# Patient Record
Sex: FEMALE | Race: WHITE | Employment: OTHER | URBAN - METROPOLITAN AREA
[De-identification: names, ages, dates, MRNs, and addresses within clinical notes are randomized per-mention and may not be internally consistent; named-entity substitution may affect disease eponyms.]

---

## 2018-08-13 DIAGNOSIS — I73.9 PVD (PERIPHERAL VASCULAR DISEASE) (HCC): Primary | ICD-10-CM

## 2019-08-09 ENCOUNTER — HOSPITAL ENCOUNTER (OUTPATIENT)
Dept: MAMMOGRAPHY | Age: 84
Discharge: HOME | End: 2019-08-11
Payer: MEDICARE

## 2019-08-09 DIAGNOSIS — Z13.820 SCREENING FOR OSTEOPOROSIS: ICD-10-CM

## 2019-08-09 PROCEDURE — 77080 DXA BONE DENSITY AXIAL: CPT

## 2019-11-24 DIAGNOSIS — M25.512 ACUTE PAIN OF BOTH SHOULDERS: Primary | ICD-10-CM

## 2019-11-24 DIAGNOSIS — M25.511 ACUTE PAIN OF BOTH SHOULDERS: Primary | ICD-10-CM

## 2019-11-25 ENCOUNTER — OFFICE VISIT (OUTPATIENT)
Dept: ORTHOPEDIC SURGERY | Age: 84
End: 2019-11-25
Payer: MEDICARE

## 2019-11-25 VITALS — BODY MASS INDEX: 31.89 KG/M2 | HEIGHT: 63 IN | WEIGHT: 180 LBS | TEMPERATURE: 98 F

## 2019-11-25 DIAGNOSIS — M19.011 OSTEOARTHRITIS OF RIGHT SHOULDER, UNSPECIFIED OSTEOARTHRITIS TYPE: ICD-10-CM

## 2019-11-25 DIAGNOSIS — M19.012 OSTEOARTHRITIS OF LEFT SHOULDER, UNSPECIFIED OSTEOARTHRITIS TYPE: Primary | ICD-10-CM

## 2019-11-25 PROCEDURE — 4040F PNEUMOC VAC/ADMIN/RCVD: CPT | Performed by: ORTHOPAEDIC SURGERY

## 2019-11-25 PROCEDURE — G8484 FLU IMMUNIZE NO ADMIN: HCPCS | Performed by: ORTHOPAEDIC SURGERY

## 2019-11-25 PROCEDURE — 1036F TOBACCO NON-USER: CPT | Performed by: ORTHOPAEDIC SURGERY

## 2019-11-25 PROCEDURE — 1090F PRES/ABSN URINE INCON ASSESS: CPT | Performed by: ORTHOPAEDIC SURGERY

## 2019-11-25 PROCEDURE — G8417 CALC BMI ABV UP PARAM F/U: HCPCS | Performed by: ORTHOPAEDIC SURGERY

## 2019-11-25 PROCEDURE — 20610 DRAIN/INJ JOINT/BURSA W/O US: CPT | Performed by: ORTHOPAEDIC SURGERY

## 2019-11-25 PROCEDURE — 1123F ACP DISCUSS/DSCN MKR DOCD: CPT | Performed by: ORTHOPAEDIC SURGERY

## 2019-11-25 PROCEDURE — G8427 DOCREV CUR MEDS BY ELIG CLIN: HCPCS | Performed by: ORTHOPAEDIC SURGERY

## 2019-11-25 PROCEDURE — 99203 OFFICE O/P NEW LOW 30 MIN: CPT | Performed by: ORTHOPAEDIC SURGERY

## 2019-11-25 RX ORDER — TRIAMCINOLONE ACETONIDE 40 MG/ML
40 INJECTION, SUSPENSION INTRA-ARTICULAR; INTRAMUSCULAR ONCE
Status: COMPLETED | OUTPATIENT
Start: 2019-11-25 | End: 2019-11-26

## 2019-11-26 RX ADMIN — TRIAMCINOLONE ACETONIDE 40 MG: 40 INJECTION, SUSPENSION INTRA-ARTICULAR; INTRAMUSCULAR at 08:06

## 2020-02-27 ENCOUNTER — OFFICE VISIT (OUTPATIENT)
Dept: ORTHOPEDIC SURGERY | Age: 85
End: 2020-02-27
Payer: MEDICARE

## 2020-02-27 VITALS — WEIGHT: 180 LBS | HEIGHT: 63 IN | BODY MASS INDEX: 31.89 KG/M2

## 2020-02-27 PROCEDURE — 4040F PNEUMOC VAC/ADMIN/RCVD: CPT | Performed by: ORTHOPAEDIC SURGERY

## 2020-02-27 PROCEDURE — G8484 FLU IMMUNIZE NO ADMIN: HCPCS | Performed by: ORTHOPAEDIC SURGERY

## 2020-02-27 PROCEDURE — 1036F TOBACCO NON-USER: CPT | Performed by: ORTHOPAEDIC SURGERY

## 2020-02-27 PROCEDURE — G8427 DOCREV CUR MEDS BY ELIG CLIN: HCPCS | Performed by: ORTHOPAEDIC SURGERY

## 2020-02-27 PROCEDURE — 20610 DRAIN/INJ JOINT/BURSA W/O US: CPT | Performed by: ORTHOPAEDIC SURGERY

## 2020-02-27 PROCEDURE — G8417 CALC BMI ABV UP PARAM F/U: HCPCS | Performed by: ORTHOPAEDIC SURGERY

## 2020-02-27 PROCEDURE — 1123F ACP DISCUSS/DSCN MKR DOCD: CPT | Performed by: ORTHOPAEDIC SURGERY

## 2020-02-27 PROCEDURE — 1090F PRES/ABSN URINE INCON ASSESS: CPT | Performed by: ORTHOPAEDIC SURGERY

## 2020-02-27 PROCEDURE — 99213 OFFICE O/P EST LOW 20 MIN: CPT | Performed by: ORTHOPAEDIC SURGERY

## 2020-02-27 RX ORDER — METHYLPREDNISOLONE 4 MG/1
TABLET ORAL
COMMUNITY
Start: 2019-12-19 | End: 2020-02-27 | Stop reason: ALTCHOICE

## 2020-02-27 RX ORDER — TRIAMCINOLONE ACETONIDE 40 MG/ML
40 INJECTION, SUSPENSION INTRA-ARTICULAR; INTRAMUSCULAR ONCE
Status: COMPLETED | OUTPATIENT
Start: 2020-02-27 | End: 2020-02-27

## 2020-02-27 RX ORDER — OMEPRAZOLE 20 MG/1
CAPSULE, DELAYED RELEASE ORAL
COMMUNITY
Start: 2020-02-19

## 2020-02-27 RX ADMIN — TRIAMCINOLONE ACETONIDE 40 MG: 40 INJECTION, SUSPENSION INTRA-ARTICULAR; INTRAMUSCULAR at 11:45

## 2020-02-27 NOTE — PROGRESS NOTES
Chief Complaint   Patient presents with    Follow-up     follow up from right shoulder pain her last injection was 11/25/19, patient states last injection helped. Enrrique Jiménez is a 80y.o. year old   female who is seen today  for evaluation of right shoulder pain. She had csi for 6 weeks relief. Chief Complaint   Patient presents with    Follow-up     follow up from right shoulder pain her last injection was 11/25/19, patient states last injection helped. Past Medical History:   Diagnosis Date    GERD (gastroesophageal reflux disease)     History of cardiovascular stress test 9/9/2014    lexiscan    Hypertension      Past Surgical History:   Procedure Laterality Date    APPENDECTOMY      BACK SURGERY  2003    removal of calcium deposits vertebral fusion 8 screws placed    BREAST LUMPECTOMY      right breast [address]  2007    left hand    HIP FRACTURE SURGERY Right     HYSTERECTOMY      complete 1977    JOINT REPLACEMENT      OTHER SURGICAL HISTORY Right 2/13/2013    ORIF right hip    ROTATOR CUFF REPAIR      right shoulder 1986    TONSILLECTOMY      TOTAL KNEE ARTHROPLASTY  2006    right knee       Current Outpatient Medications:     omeprazole (PRILOSEC) 20 MG delayed release capsule, , Disp: , Rfl:     alendronate (FOSAMAX) 70 MG tablet, Take 70 mg by mouth every 7 days, Disp: , Rfl:     Cholecalciferol (VITAMIN D3) 3000 UNITS TABS, Take by mouth daily, Disp: , Rfl:     methimazole (TAPAZOLE) 5 MG tablet, Take 5 mg by mouth daily Take one half tablet., Disp: , Rfl:     pravastatin (PRAVACHOL) 40 MG tablet, , Disp: , Rfl:     metoprolol (LOPRESSOR) 25 MG tablet, Take 0.5 tablets by mouth 2 times daily. , Disp: 60 tablet, Rfl: 0    aspirin 81 MG tablet, Take 81 mg by mouth daily. , Disp: , Rfl:     multivitamin (THERAGRAN) per tablet, Take 1 tablet by mouth daily. , Disp: , Rfl:     gabapentin (NEURONTIN) 100 MG capsule, Take 1 capsule by mouth nightly (Patient not taking: Reported on 2/27/2020), Disp: 30 capsule, Rfl: 0    diphenhydrAMINE HCl, TOPICAL, 2 % GEL, Apply 1 Tube topically 4 times daily as needed (itching) (Patient not taking: Reported on 2/27/2020), Disp: 1 Tube, Rfl: 0    polyethylene glycol (GLYCOLAX) packet, Take 17 g by mouth daily as needed. , Disp: , Rfl:   Allergies   Allergen Reactions    Dexamethasone Other (See Comments)    Augmentin [Amoxicillin-Pot Clavulanate]     Ciprofloxacin Itching    Darvocet A500 [Propoxyphene N-Acetaminophen]     Maxaquin [Lomefloxacin]     Penicillins     Sulfa Antibiotics      Social History     Socioeconomic History    Marital status:      Spouse name: Not on file    Number of children: Not on file    Years of education: Not on file    Highest education level: Not on file   Occupational History    Not on file   Social Needs    Financial resource strain: Not on file    Food insecurity:     Worry: Not on file     Inability: Not on file    Transportation needs:     Medical: Not on file     Non-medical: Not on file   Tobacco Use    Smoking status: Never Smoker    Smokeless tobacco: Never Used   Substance and Sexual Activity    Alcohol use: No    Drug use: No    Sexual activity: Not on file   Lifestyle    Physical activity:     Days per week: Not on file     Minutes per session: Not on file    Stress: Not on file   Relationships    Social connections:     Talks on phone: Not on file     Gets together: Not on file     Attends [employer] service: Not on file     Active member of club or organization: Not on file     Attends meetings of clubs or organizations: Not on file     Relationship status: Not on file    Intimate partner violence:     Fear of current or ex partner: Not on file     Emotionally abused: Not on file     Physically abused: Not on file     Forced sexual activity: Not on file   Other Topics Concern    Not on file   Social History Narrative    Not on file History reviewed. No pertinent family history. REVIEW OF SYSTEMS:     General/Constitution:  (-)weight loss, (-)fever, (-)chills, (-)weakness. Skin: (-) rash,(-) psoriasis,(-) eczema, (-)skin cancer. Musculoskeletal: (-) fractures,  (-) dislocations,(-) collagen vascular disease, (-) fibromyalgia, (-) multiple sclerosis, (-) muscular dystrophy, (-) RSD,(-) joint pain (-)swelling, (-) joint pain,swelling. Neurologic: (-) epilepsy, (-)seizures,(-) brain tumor,(-) TIA, (-)stroke, (-)headaches, (-)Parkinson disease,(-) memory loss, (-) LOC. Cardiovascular: (-) Chest pain, (-) swelling in legs/feet, (-) SOB, (-) cramping in legs/feet with walking. Respiratory: (-) SOB, (-) Coughing, (-) night sweats. GI: (-) nausea, (-) vomiting, (-) diarrhea, (-) blood in stool, (-) gastric ulcer. Psychiatric: (-) Depression, (-) Anxiety, (-) bipolar disease, (-) Alzheimer's Disease  Allergic/Immunologic: (-) allergies latex, (-) allergies metal, (-) skin sensitivity. Hematlogic: (-) anemia, (-) blood transfusion, (-) DVT/PE, (-) Clotting disorders      Subjective:    Constitution:  Ht 5' 3\" (1.6 m)   Wt 180 lb (81.6 kg)   BMI 31.89 kg/m²     Psycihatric:  The patient is alert and oriented x 3, appears to be stated age and in no distress. Respiratory:  Respiratory effort is not labored. Patient is not gasping. Palpation of the chest reveals no tactile fremitus. Skin:  Upon inspection: the skin appears warm, dry and intact. There is not a previous scar over the affected area. There is not any cellulitis, lymphedema or cutaneous lesions noted in the lower extremities. Upon palpation there is no induration noted. Neurologic:  Motor exam of the upper extremities show: The reflexes in biceps/triceps/brachioradialis are equal and symmetric. Sensory exam C5-T1 are normal bilaterally. Cardiovascular: The vascular exam is normal and is well perfused to distal extremities. There are 2+ radial pulses Radiographic findings reviewed with patient    Impression:   Encounter Diagnosis   Name Primary?  Osteoarthritis of left shoulder, unspecified osteoarthritis type Yes       Plan: Natural history and expected course discussed. Questions answered. Gentle ROM exercises  Shoulder injection. See procedure note. Verbal and written consent was obtained by the patient. The skin was prepped with alcohol, 1ml of Kenalog 40mg and 9 ml of 0.25% Marcaine was injected through the  Anterior aspect of Right shoulder into the glenohumeral  joint. The patient tolerated the injection well.     Sarah pain cream

## 2020-05-26 ENCOUNTER — OFFICE VISIT (OUTPATIENT)
Dept: ORTHOPEDIC SURGERY | Age: 85
End: 2020-05-26
Payer: MEDICARE

## 2020-05-26 VITALS — WEIGHT: 179 LBS | HEIGHT: 63 IN | BODY MASS INDEX: 31.71 KG/M2

## 2020-05-26 PROCEDURE — 1123F ACP DISCUSS/DSCN MKR DOCD: CPT | Performed by: ORTHOPAEDIC SURGERY

## 2020-05-26 PROCEDURE — 1090F PRES/ABSN URINE INCON ASSESS: CPT | Performed by: ORTHOPAEDIC SURGERY

## 2020-05-26 PROCEDURE — G8417 CALC BMI ABV UP PARAM F/U: HCPCS | Performed by: ORTHOPAEDIC SURGERY

## 2020-05-26 PROCEDURE — 1036F TOBACCO NON-USER: CPT | Performed by: ORTHOPAEDIC SURGERY

## 2020-05-26 PROCEDURE — 4040F PNEUMOC VAC/ADMIN/RCVD: CPT | Performed by: ORTHOPAEDIC SURGERY

## 2020-05-26 PROCEDURE — 20610 DRAIN/INJ JOINT/BURSA W/O US: CPT | Performed by: ORTHOPAEDIC SURGERY

## 2020-05-26 PROCEDURE — G8427 DOCREV CUR MEDS BY ELIG CLIN: HCPCS | Performed by: ORTHOPAEDIC SURGERY

## 2020-05-26 PROCEDURE — 99213 OFFICE O/P EST LOW 20 MIN: CPT | Performed by: ORTHOPAEDIC SURGERY

## 2020-05-26 RX ORDER — TRIAMCINOLONE ACETONIDE 40 MG/ML
40 INJECTION, SUSPENSION INTRA-ARTICULAR; INTRAMUSCULAR ONCE
Status: COMPLETED | OUTPATIENT
Start: 2020-05-26 | End: 2020-05-26

## 2020-05-26 RX ADMIN — TRIAMCINOLONE ACETONIDE 40 MG: 40 INJECTION, SUSPENSION INTRA-ARTICULAR; INTRAMUSCULAR at 08:38

## 2020-05-26 NOTE — PROGRESS NOTES
General/Constitution:  (-)weight loss, (-)fever, (-)chills, (-)weakness. Skin: (-) rash,(-) psoriasis,(-) eczema, (-)skin cancer. Musculoskeletal: (-) fractures,  (-) dislocations,(-) collagen vascular disease, (-) fibromyalgia, (-) multiple sclerosis, (-) muscular dystrophy, (-) RSD,(-) joint pain (-)swelling, (-) joint pain,swelling. Neurologic: (-) epilepsy, (-)seizures,(-) brain tumor,(-) TIA, (-)stroke, (-)headaches, (-)Parkinson disease,(-) memory loss, (-) LOC. Cardiovascular: (-) Chest pain, (-) swelling in legs/feet, (-) SOB, (-) cramping in legs/feet with walking. Respiratory: (-) SOB, (-) Coughing, (-) night sweats. GI: (-) nausea, (-) vomiting, (-) diarrhea, (-) blood in stool, (-) gastric ulcer. Psychiatric: (-) Depression, (-) Anxiety, (-) bipolar disease, (-) Alzheimer's Disease  Allergic/Immunologic: (-) allergies latex, (-) allergies metal, (-) skin sensitivity. Hematlogic: (-) anemia, (-) blood transfusion, (-) DVT/PE, (-) Clotting disorders      Subjective:    Constitution:  Ht 5' 3\" (1.6 m)   Wt 179 lb (81.2 kg)   BMI 31.71 kg/m²     Psycihatric:  The patient is alert and oriented x 3, appears to be stated age and in no distress. Respiratory:  Respiratory effort is not labored. Patient is not gasping. Palpation of the chest reveals no tactile fremitus. Skin:  Upon inspection: the skin appears warm, dry and intact. There is not a previous scar over the affected area. There is not any cellulitis, lymphedema or cutaneous lesions noted in the lower extremities. Upon palpation there is no induration noted. Neurologic:  Motor exam of the upper extremities show: The reflexes in biceps/triceps/brachioradialis are equal and symmetric. Sensory exam C5-T1 are normal bilaterally. Cardiovascular: The vascular exam is normal and is well perfused to distal extremities. There are 2+ radial pulses bilaterally, and motor and sensation is intact to median, ulnar, and radial, musclocutaneus, and axillary nerve distribution and grossly symmetric bilaterally. There is cap refill noted less than two seconds in all digits. There is not edema of the bilateral upper extremities. There is not varicosities noted in the distal extremities. Lymph:  Upon palpation,  there is no lymphadenopathy noted in bilateral upper extremities. Musculoskeletal:  Gait: normal; examination of the nails and digits reveal no cyanosis or clubbing. Cervical Exam:  On physical exam, Sandeep Kramer is well-developed, well-nourished, oriented to person, place and time. her gait is normal.  On evaluation of hercervical spine, She has full range of motion of the cervical spine without pain. There is no cervical tenderness to palpation. Shoulder Exam:  On evaluation of her bilaterally upper extremities, her left  shoulder has no deformity. There is tenderness upon palpation to anterior shoulder. There is not evidence of scapular dyskinesis. There is not muscle atrophy in shoulder girdle. The range of motion for the Right Shoulder is 140/30/t12 and for the Left shoulder is 90/25/l2. Right shoulder Motor strength is 4/5 in the supraspinatus, 5/5 internal rotation and 5/5 in external rotation, and Left shoulder motor strength 5/5 in supraspinatus, 5/5 in internal rotation, 5/5 in external rotation. Right shoulder has 5/5 strength throughout. Right shoulder:  negative Impingement , negative Salas ,negative  Speeds,negative  Apprehension ,negative Senior Load Shift, negative Lakshmi manuver, negative Cross arm test.     Left shoulder:  positive Impingement ,  Positive Salas ,positive  Speeds,positive  Apprehension ,negative Senior Load Shift, negative Lakshmi manuver, negative Cross arm test.     XRAY:      1. Bilateral osteoarthritis most severe left glenohumeral joint. 2. Probable rotator cuff insufficiency bilaterally.            Radiographic findings reviewed with patient    Impression: Encounter Diagnosis   Name Primary?  Osteoarthritis of left shoulder, unspecified osteoarthritis type Yes       Plan: Natural history and expected course discussed. Questions answered. Gentle ROM exercises  Shoulder injection. See procedure note. Verbal and written consent was obtained by the patient. The skin was prepped with alcohol, 1ml of Kenalog 40mg and 9 ml of 0.25% Marcaine was injected through the  Anterior aspect of left shoulder into the glenohumeral  joint. The patient tolerated the injection well.     Continue Sarah pain cream

## 2021-04-08 ENCOUNTER — HOSPITAL ENCOUNTER (EMERGENCY)
Age: 86
Discharge: ANOTHER HOSPITAL | End: 2021-04-08
Payer: MEDICARE

## 2021-04-08 VITALS
HEART RATE: 87 BPM | OXYGEN SATURATION: 96 % | SYSTOLIC BLOOD PRESSURE: 100 MMHG | HEIGHT: 63 IN | RESPIRATION RATE: 20 BRPM | TEMPERATURE: 99.3 F | BODY MASS INDEX: 31.89 KG/M2 | DIASTOLIC BLOOD PRESSURE: 71 MMHG | WEIGHT: 180 LBS

## 2021-04-08 DIAGNOSIS — R60.0 BILATERAL LOWER EXTREMITY EDEMA: Primary | ICD-10-CM

## 2021-04-08 DIAGNOSIS — R06.00 DYSPNEA, UNSPECIFIED TYPE: ICD-10-CM

## 2021-04-08 PROCEDURE — 99211 OFF/OP EST MAY X REQ PHY/QHP: CPT

## 2021-04-08 NOTE — ED PROVIDER NOTES
[facility]  Department of Emergency Medicine  UC Encounter Note  21   6:37 PM EDT      NAME: Martine Guardado  :  1932  MRN:  [id number]    Chief Complaint: Joint Swelling (Both ankles swollen. Right ankle > Left ankle. Also states right foot feels numb.)      This is a 22-year-old female the presents to urgent care with family. For the past week she has been experiencing more bilateral lower extremity swelling. She has been complaining of some fatigue and some shortness of breath as well. She does feel some numbness in her feet as well more on the right side than left. On first contact with patient she appears slightly short of breath but not in any distress. Review of Systems  Pertinent positives and negatives are stated within HPI, all other systems reviewed and are negative. Physical Exam  Vitals signs and nursing note reviewed. Constitutional:       Appearance: She is well-developed. HENT:      Head: Normocephalic and atraumatic. Right Ear: Hearing and external ear normal.      Left Ear: Hearing and external ear normal.      Nose: Nose normal.      Mouth/Throat:      Pharynx: Uvula midline. Eyes:      General: Lids are normal.      Conjunctiva/sclera: Conjunctivae normal.      Pupils: Pupils are equal, round, and reactive to light. Neck:      Musculoskeletal: Normal range of motion and neck supple. Cardiovascular:      Rate and Rhythm: Normal rate and regular rhythm. Heart sounds: Normal heart sounds. No murmur. Pulmonary:      Effort: Pulmonary effort is normal.      Breath sounds: Decreased breath sounds present. Abdominal:      General: Bowel sounds are normal.      Palpations: Abdomen is soft. Abdomen is not rigid. Tenderness: There is no abdominal tenderness. There is no guarding or rebound. Musculoskeletal:      Right lower le+ Edema present. Left lower le+ Edema present.    Skin:     General: Skin is warm and dry. Findings: No abrasion or rash. Neurological:      Mental Status: She is alert and oriented to person, place, and time. GCS: GCS eye subscore is 4. GCS verbal subscore is 5. GCS motor subscore is 6. Cranial Nerves: No cranial nerve deficit. Sensory: No sensory deficit. Coordination: Coordination normal.      Gait: Gait normal.         Procedures    MDM  Number of Diagnoses or Management Options  Bilateral lower extremity edema  Dyspnea, unspecified type  Diagnosis management comments: Even when the patient is talking she appears to be a little bit short of breath. However she is in no distress. She does complain about increased leg swelling especially on the right side. She does have palpable pedal pulses. I do not see any cyanosis in the legs. She does have some diminished lung sounds I do recommend she go to the ER immediately after discharge from urgent care. Family can take her there. I told her she needs to be checked for congestive heart failure. She agrees to go. She is stable to go by private vehicle.           --------------------------------------------- PAST HISTORY ---------------------------------------------  Past Medical History:  has a past medical history of GERD (gastroesophageal reflux disease), History of cardiovascular stress test, Hyperlipidemia, Hypertension, and Thyroid disease. Past Surgical History:  has a past surgical history that includes Hysterectomy; Breast lumpectomy; Rotator cuff repair; back surgery (2003); Total knee arthroplasty (2006); Carpal tunnel release (2007); Appendectomy; Tonsillectomy; joint replacement; other surgical history (Right, 2/13/2013); and Hip fracture surgery (Right). Social History:  reports that she has never smoked. She has never used smokeless tobacco. She reports that she does not drink alcohol or use drugs. Family History: family history is not on file.      The patients home medications have been reviewed. Allergies: Dexamethasone, Augmentin [amoxicillin-pot clavulanate], Ciprofloxacin, Darvocet a500 [propoxyphene n-acetaminophen], Maxaquin [lomefloxacin], Penicillins, and Sulfa antibiotics    -------------------------------------------------- RESULTS -------------------------------------------------  No results found for this visit on 04/08/21. No orders to display       ------------------------- NURSING NOTES AND VITALS REVIEWED ---------------------------   The nursing notes within the ED encounter and vital signs as below have been reviewed. /71   Pulse 87   Temp 99.3 °F (37.4 °C) (Infrared)   Resp 20   Ht 5' 3\" (1.6 m)   Wt 180 lb (81.6 kg)   SpO2 96%   BMI 31.89 kg/m²   Oxygen Saturation Interpretation: Normal      ------------------------------------------ PROGRESS NOTES ------------------------------------------   I have spoken with the patient and discussed todays results, in addition to providing specific details for the plan of care and counseling regarding the diagnosis and prognosis. Their questions are answered at this time and they are agreeable with the plan.      --------------------------------- ADDITIONAL PROVIDER NOTES ---------------------------------     This patient is stable for discharge. I have shared the specific conditions for return, as well as the importance of follow-up. * NOTE: This report was transcribed using voice recognition software. Every effort was made to ensure accuracy; however, inadvertent computerized transcription errors may be present.    --------------------------------- IMPRESSION AND DISPOSITION ---------------------------------    IMPRESSION  1. Bilateral lower extremity edema    2.  Dyspnea, unspecified type        DISPOSITION  Disposition: Discharge to ED  Patient condition is stable       Romario Donis PA-C  04/08/21 2011

## 2021-08-17 ENCOUNTER — HOSPITAL ENCOUNTER (OUTPATIENT)
Dept: MAMMOGRAPHY | Age: 86
Discharge: HOME | End: 2021-08-19
Payer: MEDICARE

## 2021-08-17 DIAGNOSIS — D34 THYROID ADENOMA: ICD-10-CM

## 2021-08-17 DIAGNOSIS — E05.90 HYPERTHYROIDISM, SUBCLINICAL: ICD-10-CM

## 2021-08-17 DIAGNOSIS — R73.03 PREDIABETES: ICD-10-CM

## 2021-08-17 DIAGNOSIS — M81.0 SENILE OSTEOPOROSIS: ICD-10-CM

## 2021-08-17 PROCEDURE — 77080 DXA BONE DENSITY AXIAL: CPT

## 2023-08-15 LAB
ALANINE AMINOTRANSFERASE (SGPT) (U/L) IN SER/PLAS: 11 U/L (ref 7–45)
ALBUMIN (G/DL) IN SER/PLAS: 4.3 G/DL (ref 3.4–5)
ALKALINE PHOSPHATASE (U/L) IN SER/PLAS: 49 U/L (ref 33–136)
ANION GAP IN SER/PLAS: 15 MMOL/L (ref 10–20)
ASPARTATE AMINOTRANSFERASE (SGOT) (U/L) IN SER/PLAS: 19 U/L (ref 9–39)
BILIRUBIN TOTAL (MG/DL) IN SER/PLAS: 0.4 MG/DL (ref 0–1.2)
CALCIDIOL (25 OH VITAMIN D3) (NG/ML) IN SER/PLAS: 49 NG/ML
CALCIUM (MG/DL) IN SER/PLAS: 9.8 MG/DL (ref 8.6–10.6)
CARBON DIOXIDE, TOTAL (MMOL/L) IN SER/PLAS: 25 MMOL/L (ref 21–32)
CHLORIDE (MMOL/L) IN SER/PLAS: 104 MMOL/L (ref 98–107)
CREATININE (MG/DL) IN SER/PLAS: 1.2 MG/DL (ref 0.5–1.05)
ESTIMATED AVERAGE GLUCOSE FOR HBA1C: 111 MG/DL
GFR FEMALE: 43 ML/MIN/1.73M2
GLUCOSE (MG/DL) IN SER/PLAS: 87 MG/DL (ref 74–99)
HEMOGLOBIN A1C/HEMOGLOBIN TOTAL IN BLOOD: 5.5 %
POTASSIUM (MMOL/L) IN SER/PLAS: 4.5 MMOL/L (ref 3.5–5.3)
PROTEIN TOTAL: 7.2 G/DL (ref 6.4–8.2)
SODIUM (MMOL/L) IN SER/PLAS: 139 MMOL/L (ref 136–145)
THYROTROPIN (MIU/L) IN SER/PLAS BY DETECTION LIMIT <= 0.05 MIU/L: 1.97 MIU/L (ref 0.44–3.98)
THYROXINE (T4) FREE (NG/DL) IN SER/PLAS: 1.08 NG/DL (ref 0.78–1.48)
TRIIODOTHYRONINE (T3) (NG/DL) IN SER/PLAS: 103 NG/DL (ref 60–200)
UREA NITROGEN (MG/DL) IN SER/PLAS: 23 MG/DL (ref 6–23)

## 2023-08-23 LAB — THYROID STIMULATING IMMUNOGLOBULIN: <1 TSI INDEX

## 2023-09-11 DIAGNOSIS — M25.512 ACUTE PAIN OF LEFT SHOULDER: Primary | ICD-10-CM

## 2023-10-04 DIAGNOSIS — M25.512 LEFT SHOULDER PAIN, UNSPECIFIED CHRONICITY: Primary | ICD-10-CM

## 2023-10-09 ENCOUNTER — OFFICE VISIT (OUTPATIENT)
Dept: ORTHOPEDIC SURGERY | Age: 88
End: 2023-10-09

## 2023-10-09 VITALS — HEIGHT: 63 IN | TEMPERATURE: 98 F | BODY MASS INDEX: 31.89 KG/M2

## 2023-10-09 DIAGNOSIS — M19.012 GLENOHUMERAL ARTHRITIS, LEFT: Primary | ICD-10-CM

## 2023-10-09 RX ORDER — TRIAMCINOLONE ACETONIDE 40 MG/ML
40 INJECTION, SUSPENSION INTRA-ARTICULAR; INTRAMUSCULAR ONCE
Status: COMPLETED | OUTPATIENT
Start: 2023-10-09 | End: 2023-10-09

## 2023-10-09 RX ADMIN — TRIAMCINOLONE ACETONIDE 40 MG: 40 INJECTION, SUSPENSION INTRA-ARTICULAR; INTRAMUSCULAR at 15:42

## 2023-10-09 NOTE — PROGRESS NOTES
Chief Complaint   Patient presents with    Shoulder Pain     Left Shoulder x couple months, no known recent injury, no previous left shoulder surgery. States of pain and difficulty with ROM. Previous injections years ago with good relief. Ella Zaragoza is a 80y.o. year old   female who is seen today  for evaluation of left shoulder pain. She reports the pain has been ongoing for the past 3 months. She does not recall a specific injury which started the pain. She reports the pain is worse with overhead activity, better with rest.  The patient does not have mechanical symptoms. Shedoes not have night pain. She denies a feeling of instability. The prior treatments have been Tylenol, body cream.  The patient   has not responded to the treatment. The patient is right hand dominant. The patient is not working. Chief Complaint   Patient presents with    Shoulder Pain     Left Shoulder x couple months, no known recent injury, no previous left shoulder surgery. States of pain and difficulty with ROM. Previous injections years ago with good relief.        Past Medical History:   Diagnosis Date    GERD (gastroesophageal reflux disease)     History of cardiovascular stress test 9/9/2014    lexiscan    Hyperlipidemia     Hypertension     Thyroid disease      Past Surgical History:   Procedure Laterality Date    APPENDECTOMY      BACK SURGERY  2003    removal of calcium deposits vertebral fusion 8 screws placed    BREAST LUMPECTOMY      right breast [facility]  2007    left hand    HIP FRACTURE SURGERY Right     HYSTERECTOMY      complete 1977    JOINT REPLACEMENT      OTHER SURGICAL HISTORY Right 2/13/2013    ORIF right hip    ROTATOR CUFF REPAIR      right shoulder [id number] [facility] ARTHROPLASTY  2006    right knee       Current Outpatient Medications:     omeprazole (PRILOSEC) 20 MG delayed release capsule, , Disp: , Rfl:     alendronate (FOSAMAX) 70 MG

## 2024-01-11 ENCOUNTER — OFFICE VISIT (OUTPATIENT)
Dept: ORTHOPEDIC SURGERY | Age: 89
End: 2024-01-11

## 2024-01-11 VITALS — HEIGHT: 63 IN | WEIGHT: 180 LBS | BODY MASS INDEX: 31.89 KG/M2 | TEMPERATURE: 98 F

## 2024-01-11 DIAGNOSIS — M19.012 GLENOHUMERAL ARTHRITIS, LEFT: Primary | ICD-10-CM

## 2024-01-11 RX ORDER — TRIAMCINOLONE ACETONIDE 40 MG/ML
40 INJECTION, SUSPENSION INTRA-ARTICULAR; INTRAMUSCULAR ONCE
Status: COMPLETED | OUTPATIENT
Start: 2024-01-11 | End: 2024-01-11

## 2024-01-11 RX ADMIN — TRIAMCINOLONE ACETONIDE 40 MG: 40 INJECTION, SUSPENSION INTRA-ARTICULAR; INTRAMUSCULAR at 08:16

## 2024-01-11 NOTE — PROGRESS NOTES
spine without pain.  There is no cervical tenderness to palpation.     Shoulder Exam:  On evaluation of her bilaterally upper extremities, her left shoulder has no deformity.  There is tenderness upon palpation of the anterolateral shoulder.  There is not evidence of scapular dyskinesis.  There is not muscle atrophy in shoulder girdle. The range of motion for the Right Shoulder is 150/45/t8 and for the Left shoulder is 70/30/bl.  Right shoulder Motor strength is 5/5 in the supraspinatus, 5/5 internal rotation and 5/5 in external rotation, and Left shoulder motor strength 5/5 in supraspinatus, 5/5 in internal rotation, 5/5 in external rotation.        Right shoulder:  negative Impingement , negative Salas ,negative  Speeds,negative  Apprehension ,negative Senior Load Shift, negative Lakshmi manuver, negative Cross arm test.     Left shoulder:  positive Impingement , positive Salas ,negative  Speeds,negative  Apprehension ,negative Senior Load Shift, negative Lakshmi manuver, negative Cross arm test.     XRAY:  No acute abnormality.     AC and glenohumeral joint degenerative changes.    MRI:  None    Radiographic findings reviewed with patient    Impression:   Encounter Diagnosis   Name Primary?    Glenohumeral arthritis, left Yes       Plan: Natural history and expected course discussed. Questions answered.  Educational material distributed.  Reduction in offending activity.  Gentle ROM exercises  OTC analgesics as needed.       I will proceed with a cortisone injection in the Left shoulder.  Verbal and written consent was obtained for the injection. Skin was prepped with alcohol, 1 ml of Kenalog 40mg and 9 ml of 0.25% Marcaine was injected into the anterior aspect into the glenohumeral joint of the Left shoulder. The patient tolerated the injections well. I will see the patient back prn.      I was present and performed the entire exam and or procedure.  I agree with the documentation that was recorded by my

## 2024-02-26 ENCOUNTER — OFFICE VISIT (OUTPATIENT)
Dept: ENDOCRINOLOGY | Age: 89
End: 2024-02-26
Payer: MEDICARE

## 2024-02-26 VITALS
BODY MASS INDEX: 28.17 KG/M2 | HEIGHT: 63 IN | DIASTOLIC BLOOD PRESSURE: 74 MMHG | SYSTOLIC BLOOD PRESSURE: 129 MMHG | RESPIRATION RATE: 18 BRPM | HEART RATE: 84 BPM | OXYGEN SATURATION: 98 % | WEIGHT: 159 LBS

## 2024-02-26 DIAGNOSIS — E05.90 HYPERTHYROIDISM: Primary | ICD-10-CM

## 2024-02-26 DIAGNOSIS — E55.9 VITAMIN D DEFICIENCY: ICD-10-CM

## 2024-02-26 DIAGNOSIS — M81.0 OSTEOPOROSIS, UNSPECIFIED OSTEOPOROSIS TYPE, UNSPECIFIED PATHOLOGICAL FRACTURE PRESENCE: ICD-10-CM

## 2024-02-26 PROCEDURE — 1090F PRES/ABSN URINE INCON ASSESS: CPT | Performed by: INTERNAL MEDICINE

## 2024-02-26 PROCEDURE — 1123F ACP DISCUSS/DSCN MKR DOCD: CPT | Performed by: INTERNAL MEDICINE

## 2024-02-26 PROCEDURE — G8417 CALC BMI ABV UP PARAM F/U: HCPCS | Performed by: INTERNAL MEDICINE

## 2024-02-26 PROCEDURE — 1036F TOBACCO NON-USER: CPT | Performed by: INTERNAL MEDICINE

## 2024-02-26 PROCEDURE — G8427 DOCREV CUR MEDS BY ELIG CLIN: HCPCS | Performed by: INTERNAL MEDICINE

## 2024-02-26 PROCEDURE — 99204 OFFICE O/P NEW MOD 45 MIN: CPT | Performed by: INTERNAL MEDICINE

## 2024-02-26 PROCEDURE — G8484 FLU IMMUNIZE NO ADMIN: HCPCS | Performed by: INTERNAL MEDICINE

## 2024-02-26 RX ORDER — METHIMAZOLE 5 MG/1
TABLET ORAL
Qty: 45 TABLET | Refills: 3 | Status: SHIPPED | OUTPATIENT
Start: 2024-02-26

## 2024-02-26 NOTE — PROGRESS NOTES
[facility] Department of Endocrinology Diabetes and Metabolism   [address] [place] [id number]   Phone: [phone]  Fax: [phone]    Date of Service: 2/26/2024  Primary Care Physician: Marko Phillips MD  Referring physician: Marko Phillips MD  Provider: Go Zhao MD    Reason for the visit:  Hyperthyroidism    History of Present Illness:  The history is provided by the patient. No  was used. Accuracy of the patient data is excellent.    Natali Verduzco is a very pleasant 89 y.o. female seen today for evaluation and management of hyperthyroidism     Natali Verduzco was diagnosed with hyperthyroidism many years ago and currently on Methimazole 2.5 mg Rpo-[id number]-Ud-Fri and none 3 days a weeks     Feels good on the current dose     8/2023 - TSH 1.97, FT4 1.08,    Patient denied any history of  palpitations, swelling in the area of the thyroid gland, weight loss, change in appetite, nervousness, anxiety, irritability, tremor, sweating, heat intolerance, changes in bowel habits, muscle weakness or difficulty sleeping.     Patient also denied any h/o unexplained weight gain, new fatigue, increased sensitivity to cold, dry skin, brittle fingernails, brittle hair, facial swelling/puffiness, or depressed mood.      Osteoporosis   Used to be on fosamax  in the past   On vitD supplement     PAST MEDICAL HISTORY   Past Medical History:   Diagnosis Date    GERD (gastroesophageal reflux disease)     History of cardiovascular stress test 9/9/2014    lexiscan    Hyperlipidemia     Hypertension     Thyroid disease        PAST SURGICAL HISTORY   Past Surgical History:   Procedure Laterality Date    APPENDECTOMY      BACK SURGERY  2003    removal of calcium deposits vertebral fusion 8 screws placed    BREAST LUMPECTOMY      right breast 1984    CARPAL TUNNEL RELEASE  2007    left hand    HIP FRACTURE SURGERY Right     HYSTERECTOMY (CERVIX STATUS UNKNOWN)

## 2024-03-13 ENCOUNTER — TELEPHONE (OUTPATIENT)
Dept: ENDOCRINOLOGY | Age: 89
End: 2024-03-13

## 2024-03-13 NOTE — TELEPHONE ENCOUNTER
Hospitalist Progress Note      PCP: Marco A Mcgowan MD    Date of Admission: 8/3/2020    Chief Complaint: Fall, [address] Course: Patient is 15-year-old female resident of assisted living history of hypothyroid, hypertension presented to [facility] ED after she had a fall. On my evaluation patient is confused so most of the history was obtained from the charts and talking to family over the phone Holley Mendez and Allen Doty. Per ED notes  Patient was last seen at 10:00 by the assisted living nurse. She was sitting in a chair when they came back to see her at 12 midnight they found her on the floor. Complain of left hip and knee pain.     On arrival to ED hemodynamically stable. Blood work significant for creatinine 5.3, CO2 11, potassium 6.4 troponin 0 0.22. EKG normal sinus rhythm with some PACs.     Imaging including CT head, cervical spine small right frontal scalp contusion. Comminuted displaced left occipital condyle and C1 lateral mass fracture. Prominence of the CSF space along the right convexity may related to patient positioning as opposed to subdural hygroma. Age-related cerebral volume loss. X-ray left knee no fracture or malalignment. X-ray hip no evidence of acute finding in the pelvis. Subjective:   Very Hard of hearing. Denies any pain, SOB, abdominal pain.   Currently on tube feeds  No acute events overnight      Medications:  Reviewed    Infusion Medications    dextrose       Scheduled Medications    potassium phosphate IVPB  20 mmol Intravenous Once    heparin (porcine)  5,000 Units Subcutaneous 3 times per day    levothyroxine  125 mcg Oral Daily    sodium chloride flush  10 mL Intravenous 2 times per day    insulin lispro  0-6 Units Subcutaneous TID WC    insulin lispro  0-3 Units Subcutaneous Nightly     PRN Meds: acetaminophen **OR** acetaminophen, sodium chloride flush, polyethylene glycol, promethazine **OR** ondansetron, [Held by provider] magnesium sulfate, glucose, Labs not yet reviewed due to lack of results was told to draw place labs were taken and have them fax fax number provided.   dextrose, glucagon (rDNA), dextrose, perflutren lipid microspheres      Intake/Output Summary (Last 24 hours) at 8/8/2020 1256  Last data filed at 8/8/2020 1011  Gross per 24 hour   Intake 1926 ml   Output 3425 ml   Net -1499 ml       Physical Exam Performed:    BP (!) 154/90   Pulse 113   Temp 99.1 °F (37.3 °C) (Axillary)   Resp 19   Ht 5' 2\" (1.575 m)   Wt 157 lb 6.5 oz (71.4 kg)   SpO2 96%   BMI 28.79 kg/m²     General appearance: Alert, asking for food. HEENT: Pupils equal, round, and reactive to light. Conjunctivae/corneas clear. Neck: [place]anne J collar present  Respiratory:  Normal respiratory effort. Clear to auscultation, bilaterally without Rales/Wheezes/Rhonchi. Cardiovascular: Regular rate and rhythm with normal S1/S2 without murmurs, rubs or gallops. Abdomen: Soft, non-tender, non-distended with normal bowel sounds. Musculoskeletal: No clubbing, cyanosis or edema bilaterally. Full range of motion without deformity. Skin: Skin color, texture, turgor normal.  No rashes or lesions. Neurologic:  Neurovascularly intact without any focal sensory/motor deficits. Cranial nerves: II-XII intact, grossly non-focal.        Labs:   Recent Labs     08/06/20 0329 08/07/20  0505 08/08/20  0605   WBC 5.3 5.1 4.8   HGB 10.2* 9.5* 8.9*   HCT 30.3* 28.2* 26.0*    150 135     Recent Labs     08/06/20 0329 08/07/20  0505 08/08/20  0605    141 141   K 4.6 4.5  4.5 4.7    105 109   CO2 25 25 25   BUN 38* 28* 30*   CREATININE 2.2* 1.8* 1.7*   CALCIUM 8.3 8.4 8.3   PHOS 2.8 2.2* 1.3*     Recent Labs     08/06/20 0329 08/07/20  0505 08/08/20  0605   AST 24 26 23   ALT 10 10 10   BILITOT 0.3 0.3 <0.2   ALKPHOS 65 62 68     Recent Labs     08/06/20 0329 08/07/20  0505   INR 0.96 1.02     No results for input(s): CKTOTAL, TROPONINI in the last 72 hours.     Urinalysis:      Lab Results   Component Value Date    NITRU Negative 08/03/2020    WBCUA 335 08/03/2020    BACTERIA Rare 01/07/2020    RBCUA 77 08/03/2020    BLOODU LARGE 08/03/2020    SPECGRAV 1.013 08/03/2020    GLUCOSEU Negative 08/03/2020    GLUCOSEU NEGATIVE 05/17/2010       Radiology:  XR ABDOMEN (KUB) (SINGLE AP VIEW)   Final Result      NG tube advanced from the GE junction with tip overlying the expected position of the distal stomach. XR ABDOMEN (KUB) (SINGLE AP VIEW)   Final Result   Impression: Nasogastric tube is coiled within the distal esophagus. MRA NECK WO CONTRAST   Final Result   1. No evidence of acute infarct or other acute intracranial abnormality. 2. Moderate global volume loss with moderate chronic small vessel ischemic disease within the periventricular white matter. MRA NECK:      FINDINGS: The vertebral arteries are patent bilaterally and equal in size. No evidence of vertebral artery occlusion. Both vertebral arteries contribute to the basilar artery. The common carotid arteries are patent bilaterally. The internal and external carotid arteries are patent bilaterally. There is no high-grade stenosis. There is no evidence of vessel occlusion. IMPRESSION:   1. Patent carotid and vertebral arteries. No evidence of acute dissection or occlusion. MRI BRAIN WO CONTRAST   Final Result   1. No evidence of acute infarct or other acute intracranial abnormality. 2. Moderate global volume loss with moderate chronic small vessel ischemic disease within the periventricular white matter. MRA NECK:      FINDINGS: The vertebral arteries are patent bilaterally and equal in size. No evidence of vertebral artery occlusion. Both vertebral arteries contribute to the basilar artery. The common carotid arteries are patent bilaterally. The internal and external carotid arteries are patent bilaterally. There is no high-grade stenosis. There is no evidence of vessel occlusion. IMPRESSION:   1. Patent carotid and vertebral arteries. No evidence of acute dissection or occlusion.       CT ABDOMEN PELVIS WO left occipital condyle and C1 left lateral mass fracture  Neurosurgery consult reviewed and appreciated  [place] J collar to be worn at all times  Cervical collar does NOT need to be worn when eating, bathing or showering  Cervical collar pads to be cleaned with soap & water, air dried, and changed daily  And looks comfortable currently on as needed Tylenol    I called the son who is the health power of  and discussed the possibility that her swallowing may not improve  I also discussed options which included proceeding with the PEG tube versus comfort care  Son tells me that they had lengthy family discussion and they have opted to go ahead with the PEG tube      DVT Prophylaxis: Subcu heparin  Diet: DIET TUBE FEED CONTINUOUS/CYCLIC NPO; STANDARD WITH FIBER (Jevity 1.2); Nasogastric; Continuous; 20; 55; 23  Dietary Nutrition Supplements: Protein Modular  Code Status: Limited    PT/OT Eval Status: Consulted    Dispo -[facility] placement. Cont inpatient care.      This chart was likely completed using voice recognition technology and may contain unintended grammatical , phraseology,and/or punctuation errors      Tariq Jeronimo MD

## 2024-03-15 NOTE — TELEPHONE ENCOUNTER
Pt called and said she said she spoke with [facility] regarding faxing her lab results to the endo office.  They said that the office would have to call them at [phone] and request that they be resent.   She had lab done on 3/4, and it was previously faxed 3/5.  Staff unavailable due to pt care.  Please contact [facility].

## 2024-04-11 ENCOUNTER — OFFICE VISIT (OUTPATIENT)
Dept: ORTHOPEDIC SURGERY | Age: 89
End: 2024-04-11
Payer: MEDICARE

## 2024-04-11 VITALS — TEMPERATURE: 98 F | WEIGHT: 159 LBS | HEIGHT: 63 IN | BODY MASS INDEX: 28.17 KG/M2

## 2024-04-11 DIAGNOSIS — M19.012 GLENOHUMERAL ARTHRITIS, LEFT: Primary | ICD-10-CM

## 2024-04-11 PROCEDURE — 1090F PRES/ABSN URINE INCON ASSESS: CPT | Performed by: ORTHOPAEDIC SURGERY

## 2024-04-11 PROCEDURE — 99213 OFFICE O/P EST LOW 20 MIN: CPT | Performed by: ORTHOPAEDIC SURGERY

## 2024-04-11 PROCEDURE — 1123F ACP DISCUSS/DSCN MKR DOCD: CPT | Performed by: ORTHOPAEDIC SURGERY

## 2024-04-11 PROCEDURE — G8417 CALC BMI ABV UP PARAM F/U: HCPCS | Performed by: ORTHOPAEDIC SURGERY

## 2024-04-11 PROCEDURE — 20610 DRAIN/INJ JOINT/BURSA W/O US: CPT | Performed by: ORTHOPAEDIC SURGERY

## 2024-04-11 PROCEDURE — 1036F TOBACCO NON-USER: CPT | Performed by: ORTHOPAEDIC SURGERY

## 2024-04-11 PROCEDURE — G8427 DOCREV CUR MEDS BY ELIG CLIN: HCPCS | Performed by: ORTHOPAEDIC SURGERY

## 2024-04-11 RX ORDER — TRIAMCINOLONE ACETONIDE 40 MG/ML
40 INJECTION, SUSPENSION INTRA-ARTICULAR; INTRAMUSCULAR ONCE
Status: COMPLETED | OUTPATIENT
Start: 2024-04-11 | End: 2024-04-11

## 2024-04-11 RX ADMIN — TRIAMCINOLONE ACETONIDE 40 MG: 40 INJECTION, SUSPENSION INTRA-ARTICULAR; INTRAMUSCULAR at 13:17

## 2024-04-11 NOTE — PROGRESS NOTES
management at this time.       I will proceed with a cortisone injection in the Left shoulder.  Verbal and written consent was obtained for the injection. Skin was prepped with alcohol, 1 ml of Kenalog 40mg and 9 ml of 0.25% Marcaine was injected into the anterior aspect into the glenohumeral joint of the Left shoulder. The patient tolerated the injections well. I will see the patient back in 3 months.

## 2024-04-12 ENCOUNTER — TELEPHONE (OUTPATIENT)
Dept: ENDOCRINOLOGY | Age: 89
End: 2024-04-12

## 2024-04-12 NOTE — TELEPHONE ENCOUNTER
Notify patient  Excellent, your thyroid hormones are very good.  Continue current dose of the methimazole

## 2024-07-18 ENCOUNTER — OFFICE VISIT (OUTPATIENT)
Dept: ORTHOPEDIC SURGERY | Age: 89
End: 2024-07-18
Payer: MEDICARE

## 2024-07-18 VITALS — WEIGHT: 159 LBS | HEIGHT: 63 IN | TEMPERATURE: 98 F | BODY MASS INDEX: 28.17 KG/M2

## 2024-07-18 DIAGNOSIS — M19.012 GLENOHUMERAL ARTHRITIS, LEFT: Primary | ICD-10-CM

## 2024-07-18 PROCEDURE — 1090F PRES/ABSN URINE INCON ASSESS: CPT | Performed by: ORTHOPAEDIC SURGERY

## 2024-07-18 PROCEDURE — 1036F TOBACCO NON-USER: CPT | Performed by: ORTHOPAEDIC SURGERY

## 2024-07-18 PROCEDURE — G8417 CALC BMI ABV UP PARAM F/U: HCPCS | Performed by: ORTHOPAEDIC SURGERY

## 2024-07-18 PROCEDURE — G8427 DOCREV CUR MEDS BY ELIG CLIN: HCPCS | Performed by: ORTHOPAEDIC SURGERY

## 2024-07-18 PROCEDURE — 1123F ACP DISCUSS/DSCN MKR DOCD: CPT | Performed by: ORTHOPAEDIC SURGERY

## 2024-07-18 PROCEDURE — 99213 OFFICE O/P EST LOW 20 MIN: CPT | Performed by: ORTHOPAEDIC SURGERY

## 2024-07-18 PROCEDURE — 20610 DRAIN/INJ JOINT/BURSA W/O US: CPT | Performed by: ORTHOPAEDIC SURGERY

## 2024-07-18 RX ORDER — TRIAMCINOLONE ACETONIDE 40 MG/ML
40 INJECTION, SUSPENSION INTRA-ARTICULAR; INTRAMUSCULAR ONCE
Status: COMPLETED | OUTPATIENT
Start: 2024-07-18 | End: 2024-07-18

## 2024-07-18 RX ADMIN — TRIAMCINOLONE ACETONIDE 40 MG: 40 INJECTION, SUSPENSION INTRA-ARTICULAR; INTRAMUSCULAR at 14:44

## 2024-07-18 NOTE — PROGRESS NOTES
Itching    Darvocet A500 [Propoxyphene N-Acetaminophen]     Maxaquin [Lomefloxacin]     Penicillins     Sulfa Antibiotics      Social History     Socioeconomic History    Marital status:      Spouse name: Not on file    Number of children: Not on file    Years of education: Not on file    Highest education level: Not on file   Occupational History    Not on file   Tobacco Use    Smoking status: Never    Smokeless tobacco: Never   Vaping Use    Vaping Use: Never used   Substance and Sexual Activity    Alcohol use: No    Drug use: No    Sexual activity: Not on file   Other Topics Concern    Not on file   Social History Narrative    Not on file     Social Determinants of Health     Financial Resource Strain: Not on file   Food Insecurity: Not on file   Transportation Needs: Not on file   Physical Activity: Not on file   Stress: Not on file   Social Connections: Not on file   Intimate Partner Violence: Not on file   Housing Stability: Not on file     No family history on file.    REVIEW OF SYSTEMS:     General/Constitution:  (-)weight loss, (-)fever, (-)chills, (-)weakness.   Skin: (-) rash,(-) psoriasis,(-) eczema, (-)skin cancer.   Musculoskeletal: (-) fractures,  (-) dislocations,(-) collagen vascular disease, (-) fibromyalgia, (-) multiple sclerosis, (-) muscular dystrophy, (-) RSD,(-) joint pain (-)swelling, (-) joint pain,swelling.  Neurologic: (-) epilepsy, (-)seizures,(-) brain tumor,(-) TIA, (-)stroke, (-)headaches, (-)Parkinson disease,(-) memory loss, (-) LOC.  Cardiovascular: (-) Chest pain, (-) swelling in legs/feet, (-) SOB, (-) cramping in legs/feet with walking.  Respiratory: (-) SOB, (-) Coughing, (-) night sweats.  GI: (-) nausea, (-) vomiting, (-) diarrhea, (-) blood in stool, (-) gastric ulcer.  Psychiatric: (-) Depression, (-) Anxiety, (-) bipolar disease, (-) Alzheimer's Disease  Allergic/Immunologic: (-) allergies latex, (-) allergies metal, (-) skin sensitivity.  Hematlogic: (-) anemia,

## 2024-09-06 ENCOUNTER — OFFICE VISIT (OUTPATIENT)
Dept: ENDOCRINOLOGY | Age: 89
End: 2024-09-06
Payer: MEDICARE

## 2024-09-06 VITALS
SYSTOLIC BLOOD PRESSURE: 144 MMHG | HEART RATE: 68 BPM | OXYGEN SATURATION: 100 % | BODY MASS INDEX: 29.59 KG/M2 | HEIGHT: 63 IN | DIASTOLIC BLOOD PRESSURE: 70 MMHG | WEIGHT: 167 LBS

## 2024-09-06 DIAGNOSIS — M81.0 OSTEOPOROSIS, UNSPECIFIED OSTEOPOROSIS TYPE, UNSPECIFIED PATHOLOGICAL FRACTURE PRESENCE: ICD-10-CM

## 2024-09-06 DIAGNOSIS — E05.90 HYPERTHYROIDISM: Primary | ICD-10-CM

## 2024-09-06 DIAGNOSIS — E55.9 VITAMIN D DEFICIENCY: ICD-10-CM

## 2024-09-06 PROCEDURE — 99214 OFFICE O/P EST MOD 30 MIN: CPT | Performed by: INTERNAL MEDICINE

## 2024-09-06 PROCEDURE — G8427 DOCREV CUR MEDS BY ELIG CLIN: HCPCS | Performed by: INTERNAL MEDICINE

## 2024-09-06 PROCEDURE — 1123F ACP DISCUSS/DSCN MKR DOCD: CPT | Performed by: INTERNAL MEDICINE

## 2024-09-06 PROCEDURE — 1090F PRES/ABSN URINE INCON ASSESS: CPT | Performed by: INTERNAL MEDICINE

## 2024-09-06 PROCEDURE — 1036F TOBACCO NON-USER: CPT | Performed by: INTERNAL MEDICINE

## 2024-09-06 PROCEDURE — G8417 CALC BMI ABV UP PARAM F/U: HCPCS | Performed by: INTERNAL MEDICINE

## 2024-09-06 NOTE — PROGRESS NOTES
[facility] Department of Endocrinology Diabetes and Metabolism   [address] [place] [id number]   Phone: [phone]  Fax: [phone]    Date of Service: 9/6/2024  Primary Care Physician: Marko Phillips MD  Referring physician: No ref. provider found  Provider: Go Zhao MD    Reason for the visit:  Hyperthyroidism    History of Present Illness:  The history is provided by the patient. No  was used. Accuracy of the patient data is excellent.    Natali Verduzco is a very pleasant 89 y.o. female seen today for evaluation and management of hyperthyroidism     Natali Verduzco was diagnosed with hyperthyroidism many years ago and currently on Methimazole 2.5 mg Cpf-Bsz-Ft-Fri and none 3 days a weeks     Feels good on the current dose     Blood work from March 2024 showed a TSH of 2.34    Patient denied any history of  palpitations, swelling in the area of the thyroid gland, weight loss, change in appetite, nervousness, anxiety, irritability, tremor, sweating, heat intolerance, changes in bowel habits, muscle weakness or difficulty sleeping.     Patient also denied any h/o unexplained weight gain, new fatigue, increased sensitivity to cold, dry skin, brittle fingernails, brittle hair, facial swelling/puffiness, or depressed mood.      Osteoporosis   DEXA scan in August 2021 showed a T-score of the femoral neck of -2.4 and total hip of -2.3, and the nondominant distal forearm of -5.1    The patient used to be on Fosamax in the past    She is on daily vitamin D supplement    PAST MEDICAL HISTORY   Past Medical History:   Diagnosis Date    GERD (gastroesophageal reflux disease)     History of cardiovascular stress test 9/9/2014    lexiscan    Hyperlipidemia     Hypertension     Thyroid disease        PAST SURGICAL HISTORY   Past Surgical History:   Procedure Laterality Date    APPENDECTOMY      BACK SURGERY  2003    removal of calcium deposits vertebral

## 2024-10-10 ENCOUNTER — OFFICE VISIT (OUTPATIENT)
Dept: ORTHOPEDIC SURGERY | Age: 89
End: 2024-10-10

## 2024-10-10 VITALS — HEIGHT: 63 IN | WEIGHT: 167 LBS | BODY MASS INDEX: 29.59 KG/M2

## 2024-10-10 DIAGNOSIS — M19.012 GLENOHUMERAL ARTHRITIS, LEFT: Primary | ICD-10-CM

## 2024-10-10 RX ORDER — TRIAMCINOLONE ACETONIDE 40 MG/ML
40 INJECTION, SUSPENSION INTRA-ARTICULAR; INTRAMUSCULAR ONCE
Status: COMPLETED | OUTPATIENT
Start: 2024-10-10 | End: 2024-10-13

## 2024-10-10 NOTE — PROGRESS NOTES
Disease  Allergic/Immunologic: (-) allergies latex, (-) allergies metal, (-) skin sensitivity.  Hematlogic: (-) anemia, (-) blood transfusion, (-) DVT/PE, (-) Clotting disorders      Subjective:    Constitution:  Ht 1.6 m (5' 3\")   Wt 75.8 kg (167 lb)   BMI 29.58 kg/m²     Psycihatric:  The patient is alert and oriented x 3, appears to be stated age and in no distress.      Respiratory:  Respiratory effort is not labored.  Patient is not gasping.  Palpation of the chest reveals no tactile fremitus.    Skin:  Upon inspection: the skin appears warm, dry and intact.  There is not a previous scar over the affected area.There is not any cellulitis, lymphedema or cutaneous lesions noted in the lower extremities.   Upon palpation there is no induration noted.      Neurologic:  Motor exam of the upper extremities show: The reflexes in biceps/triceps/brachioradialis are equal and symmetric.  Sensory exam C5-T1 are normal bilaterally.     Cardiovascular:  The vascular exam is normal and is well perfused to distal extremities.There are 2+ radial pulses bilaterally, and motor and sensation is intact to median, ulnar, and radial, musclocutaneus, and axillary nerve distribution and grossly symmetric bilaterally.  There is cap refill noted less than two seconds in all digits. There is not edema of the bilateral upper extremities.  There is not varicosities noted in the distal extremities.      Lymph:  Upon palpation,  there is no lymphadenopathy noted in bilateral upper extremities.      Musculoskeletal:  Gait: normal; examination of the nails and digits reveal no cyanosis or clubbing.      Cervical Exam:  On physical exam, Natali Verduzco is well-developed, well-nourished, oriented to person, place and time.  her gait is normal.  On evaluation of hercervical spine, She has full range of motion of the cervical spine without pain.  There is no cervical tenderness to palpation.     Shoulder Exam:  On evaluation of her bilaterally

## 2024-10-13 RX ADMIN — TRIAMCINOLONE ACETONIDE 40 MG: 40 INJECTION, SUSPENSION INTRA-ARTICULAR; INTRAMUSCULAR at 21:22

## 2024-11-27 DIAGNOSIS — E05.90 HYPERTHYROIDISM: ICD-10-CM

## 2024-11-27 RX ORDER — METHIMAZOLE 5 MG/1
TABLET ORAL
Qty: 30 TABLET | Refills: 3 | Status: SHIPPED | OUTPATIENT
Start: 2024-11-27

## 2024-11-27 NOTE — TELEPHONE ENCOUNTER
Reference number: [id number]    Continue methimazole 2.5 mg 4 days a week and none 3 days a week

## 2025-01-14 ENCOUNTER — OFFICE VISIT (OUTPATIENT)
Dept: ORTHOPEDIC SURGERY | Age: 89
End: 2025-01-14
Payer: MEDICARE

## 2025-01-14 VITALS — WEIGHT: 167 LBS | BODY MASS INDEX: 29.59 KG/M2 | HEIGHT: 63 IN

## 2025-01-14 DIAGNOSIS — M19.012 GLENOHUMERAL ARTHRITIS, LEFT: Primary | ICD-10-CM

## 2025-01-14 PROCEDURE — 20610 DRAIN/INJ JOINT/BURSA W/O US: CPT | Performed by: ORTHOPAEDIC SURGERY

## 2025-01-14 PROCEDURE — 1125F AMNT PAIN NOTED PAIN PRSNT: CPT | Performed by: ORTHOPAEDIC SURGERY

## 2025-01-14 PROCEDURE — 1090F PRES/ABSN URINE INCON ASSESS: CPT | Performed by: ORTHOPAEDIC SURGERY

## 2025-01-14 PROCEDURE — G8427 DOCREV CUR MEDS BY ELIG CLIN: HCPCS | Performed by: ORTHOPAEDIC SURGERY

## 2025-01-14 PROCEDURE — 99213 OFFICE O/P EST LOW 20 MIN: CPT | Performed by: ORTHOPAEDIC SURGERY

## 2025-01-14 PROCEDURE — 1123F ACP DISCUSS/DSCN MKR DOCD: CPT | Performed by: ORTHOPAEDIC SURGERY

## 2025-01-14 PROCEDURE — 1159F MED LIST DOCD IN RCRD: CPT | Performed by: ORTHOPAEDIC SURGERY

## 2025-01-14 PROCEDURE — 1036F TOBACCO NON-USER: CPT | Performed by: ORTHOPAEDIC SURGERY

## 2025-01-14 PROCEDURE — G8417 CALC BMI ABV UP PARAM F/U: HCPCS | Performed by: ORTHOPAEDIC SURGERY

## 2025-01-14 NOTE — PROGRESS NOTES
Chief Complaint   Patient presents with    Shoulder Pain     Left Shoulder F/U         Natali Verduzco is a 89 y.o. year old   female who is seen today for follow up left shoulder. She stated her pain returned about 2 months ago. She has been doing PT and takes Tylenol.    Chief Complaint   Patient presents with    Shoulder Pain     Left Shoulder F/U      Past Medical History:   Diagnosis Date    GERD (gastroesophageal reflux disease)     History of cardiovascular stress test 9/9/2014    lexiscan    Hyperlipidemia     Hypertension     Thyroid disease      Past Surgical History:   Procedure Laterality Date    APPENDECTOMY      BACK SURGERY  2003    removal of calcium deposits vertebral fusion 8 screws placed    BREAST LUMPECTOMY      right breast 1984    CARPAL TUNNEL RELEASE  2007    left hand    HIP FRACTURE SURGERY Right     HYSTERECTOMY (CERVIX STATUS UNKNOWN)      complete 1977    JOINT REPLACEMENT      OTHER SURGICAL HISTORY Right 2/13/2013    ORIF right hip    ROTATOR CUFF REPAIR      right shoulder 1986    TONSILLECTOMY      TOTAL KNEE ARTHROPLASTY  2006    right knee       Current Outpatient Medications:     methIMAzole (TAPAZOLE) 5 MG tablet, Take 1/2 tablet (2.5 mg) Yhowmiv-Bmonfhxak-Jlyeeupl- Friday and NONE on Cvphphnw-Moztap-Yfzpra, Disp: 30 tablet, Rfl: 3    omeprazole (PRILOSEC) 20 MG delayed release capsule, , Disp: , Rfl:     Cholecalciferol (VITAMIN D3) 3000 UNITS TABS, Take by mouth daily, Disp: , Rfl:     pravastatin (PRAVACHOL) 40 MG tablet, , Disp: , Rfl:     metoprolol (LOPRESSOR) 25 MG tablet, Take 0.5 tablets by mouth 2 times daily., Disp: 60 tablet, Rfl: 0    aspirin 81 MG tablet, Take 1 tablet by mouth daily, Disp: , Rfl:     polyethylene glycol (GLYCOLAX) packet, Take 1 packet by mouth daily as needed, Disp: , Rfl:     multivitamin (THERAGRAN) per tablet, Take 1 tablet by mouth daily, Disp: , Rfl:   Allergies   Allergen Reactions    Dexamethasone Other (See Comments)    Augmentin

## 2025-01-20 RX ORDER — TRIAMCINOLONE ACETONIDE 40 MG/ML
40 INJECTION, SUSPENSION INTRA-ARTICULAR; INTRAMUSCULAR ONCE
Status: COMPLETED | OUTPATIENT
Start: 2025-01-20 | End: 2025-01-20

## 2025-01-20 RX ADMIN — TRIAMCINOLONE ACETONIDE 40 MG: 40 INJECTION, SUSPENSION INTRA-ARTICULAR; INTRAMUSCULAR at 15:24

## 2025-04-15 ENCOUNTER — OFFICE VISIT (OUTPATIENT)
Dept: ORTHOPEDIC SURGERY | Age: 89
End: 2025-04-15
Payer: MEDICARE

## 2025-04-15 VITALS — HEIGHT: 63 IN | BODY MASS INDEX: 29.59 KG/M2 | TEMPERATURE: 98.6 F | WEIGHT: 167 LBS

## 2025-04-15 DIAGNOSIS — M19.012 GLENOHUMERAL ARTHRITIS, LEFT: Primary | ICD-10-CM

## 2025-04-15 PROCEDURE — 1159F MED LIST DOCD IN RCRD: CPT | Performed by: ORTHOPAEDIC SURGERY

## 2025-04-15 PROCEDURE — G8417 CALC BMI ABV UP PARAM F/U: HCPCS | Performed by: ORTHOPAEDIC SURGERY

## 2025-04-15 PROCEDURE — 1090F PRES/ABSN URINE INCON ASSESS: CPT | Performed by: ORTHOPAEDIC SURGERY

## 2025-04-15 PROCEDURE — 1123F ACP DISCUSS/DSCN MKR DOCD: CPT | Performed by: ORTHOPAEDIC SURGERY

## 2025-04-15 PROCEDURE — 99213 OFFICE O/P EST LOW 20 MIN: CPT | Performed by: ORTHOPAEDIC SURGERY

## 2025-04-15 PROCEDURE — 1036F TOBACCO NON-USER: CPT | Performed by: ORTHOPAEDIC SURGERY

## 2025-04-15 PROCEDURE — 20610 DRAIN/INJ JOINT/BURSA W/O US: CPT | Performed by: ORTHOPAEDIC SURGERY

## 2025-04-15 PROCEDURE — G8427 DOCREV CUR MEDS BY ELIG CLIN: HCPCS | Performed by: ORTHOPAEDIC SURGERY

## 2025-04-15 RX ORDER — TRIAMCINOLONE ACETONIDE 40 MG/ML
40 INJECTION, SUSPENSION INTRA-ARTICULAR; INTRAMUSCULAR ONCE
Status: COMPLETED | OUTPATIENT
Start: 2025-04-15 | End: 2025-04-15

## 2025-04-15 RX ADMIN — TRIAMCINOLONE ACETONIDE 40 MG: 40 INJECTION, SUSPENSION INTRA-ARTICULAR; INTRAMUSCULAR at 08:23

## 2025-04-15 NOTE — PROGRESS NOTES
Chief Complaint   Patient presents with    Follow-up     Left shoulder f/u. Patient states that the last treatment worked very well. States it lasted about 2 months. Having some discomfort today. Pain is dependent on movement.         Natali Verduzco is a 89 y.o. year old   female who is seen today for follow up left shoulder. She stated her pain started to return a few weeks ago. Previous treatment was successful.    Chief Complaint   Patient presents with    Follow-up     Left shoulder f/u. Patient states that the last treatment worked very well. States it lasted about 2 months. Having some discomfort today. Pain is dependent on movement.      Past Medical History:   Diagnosis Date    GERD (gastroesophageal reflux disease)     History of cardiovascular stress test 9/9/2014    lexiscan    Hyperlipidemia     Hypertension     Thyroid disease      Past Surgical History:   Procedure Laterality Date    APPENDECTOMY      BACK SURGERY  2003    removal of calcium deposits vertebral fusion 8 screws placed    BREAST LUMPECTOMY      right breast 1984    CARPAL TUNNEL RELEASE  2007    left hand    HIP FRACTURE SURGERY Right     HYSTERECTOMY (CERVIX STATUS UNKNOWN)      complete 1977    JOINT REPLACEMENT      OTHER SURGICAL HISTORY Right 2/13/2013    ORIF right hip    ROTATOR CUFF REPAIR      right shoulder 1986    TONSILLECTOMY      TOTAL KNEE ARTHROPLASTY  2006    right knee       Current Outpatient Medications:     methIMAzole (TAPAZOLE) 5 MG tablet, Take 1/2 tablet (2.5 mg) Gjqnymg-Oflidkmum-Cwfrhjfw- Friday and NONE on Tdqcsykh-Rmfxur-Vqzeat, Disp: 30 tablet, Rfl: 3    omeprazole (PRILOSEC) 20 MG delayed release capsule, , Disp: , Rfl:     Cholecalciferol (VITAMIN D3) 3000 UNITS TABS, Take by mouth daily, Disp: , Rfl:     pravastatin (PRAVACHOL) 40 MG tablet, , Disp: , Rfl:     metoprolol (LOPRESSOR) 25 MG tablet, Take 0.5 tablets by mouth 2 times daily., Disp: 60 tablet, Rfl: 0    aspirin 81 MG tablet, Take 1

## 2025-05-22 ENCOUNTER — OFFICE VISIT (OUTPATIENT)
Dept: ENDOCRINOLOGY | Age: 89
End: 2025-05-22
Payer: MEDICARE

## 2025-05-22 VITALS
OXYGEN SATURATION: 96 % | TEMPERATURE: 97.5 F | WEIGHT: 170 LBS | HEIGHT: 63 IN | RESPIRATION RATE: 18 BRPM | HEART RATE: 74 BPM | BODY MASS INDEX: 30.12 KG/M2 | DIASTOLIC BLOOD PRESSURE: 78 MMHG | SYSTOLIC BLOOD PRESSURE: 142 MMHG

## 2025-05-22 DIAGNOSIS — E78.5 DYSLIPIDEMIA: ICD-10-CM

## 2025-05-22 DIAGNOSIS — E05.90 HYPERTHYROIDISM: Primary | ICD-10-CM

## 2025-05-22 DIAGNOSIS — M81.0 OSTEOPOROSIS, UNSPECIFIED OSTEOPOROSIS TYPE, UNSPECIFIED PATHOLOGICAL FRACTURE PRESENCE: ICD-10-CM

## 2025-05-22 PROCEDURE — 1036F TOBACCO NON-USER: CPT | Performed by: INTERNAL MEDICINE

## 2025-05-22 PROCEDURE — G8427 DOCREV CUR MEDS BY ELIG CLIN: HCPCS | Performed by: INTERNAL MEDICINE

## 2025-05-22 PROCEDURE — 1123F ACP DISCUSS/DSCN MKR DOCD: CPT | Performed by: INTERNAL MEDICINE

## 2025-05-22 PROCEDURE — G2211 COMPLEX E/M VISIT ADD ON: HCPCS | Performed by: INTERNAL MEDICINE

## 2025-05-22 PROCEDURE — 1159F MED LIST DOCD IN RCRD: CPT | Performed by: INTERNAL MEDICINE

## 2025-05-22 PROCEDURE — G8417 CALC BMI ABV UP PARAM F/U: HCPCS | Performed by: INTERNAL MEDICINE

## 2025-05-22 PROCEDURE — 99214 OFFICE O/P EST MOD 30 MIN: CPT | Performed by: INTERNAL MEDICINE

## 2025-05-22 PROCEDURE — 1090F PRES/ABSN URINE INCON ASSESS: CPT | Performed by: INTERNAL MEDICINE

## 2025-05-22 RX ORDER — METHIMAZOLE 5 MG/1
TABLET ORAL
Qty: 30 TABLET | Refills: 3 | Status: SHIPPED | OUTPATIENT
Start: 2025-05-22

## 2025-05-22 NOTE — PROGRESS NOTES
weakness,     OBJECTIVE    BP (!) 142/78   Pulse 74   Temp 97.5 °F (36.4 °C) (Temporal)   Resp 18   Ht 1.6 m (5' 3\")   Wt 77.1 kg (170 lb)   SpO2 96%   BMI 30.11 kg/m²   BP Readings from Last 4 Encounters:   05/22/25 (!) 142/78   09/06/24 (!) 144/70   02/26/24 129/74   04/08/21 100/71     Wt Readings from Last 6 Encounters:   05/22/25 77.1 kg (170 lb)   04/15/25 75.8 kg (167 lb)   01/14/25 75.8 kg (167 lb)   10/10/24 75.8 kg (167 lb)   09/06/24 75.8 kg (167 lb)   07/18/24 72.1 kg (159 lb)       Physical examination:  General: awake alert, oriented x3, no abnormal position or movements.   HEENT: normocephalic non traumatic, no exophthalmos, no lid lag, no lid retraction   Neck: supple, no LN enlargement, no thyromegaly, no thyroid tenderness, no thyroid bruit, no JVD.  Pulm: Clear equal air entry no added sounds, no wheezing or rhonchi    CVS: S1 + S2, no murmur, no heave. Dorsalis pedis pulse palpable   Abd: soft lax, no tenderness, no organomegaly, audible bowel sounds.  Skin: warm, no lesions, no rash. No palmar erythema, no onycholysis, no pretibial Myxoedema, no acropachy   Musculoskeletal: No back tenderness, no kyphosis/scoliosis    Neuro: CN intact, sensation notmal , muscle power normal. No tremors   Psych: normal mood, and affect    Review of Laboratory Data:  I have reviewed the following:  Lab Results   Component Value Date/Time    WBC 8.0 07/27/2017 03:00 PM    RBC 4.09 07/27/2017 03:00 PM    HGB 12.2 07/27/2017 03:00 PM    HCT 38.8 07/27/2017 03:00 PM    MCV 94.9 07/27/2017 03:00 PM    MCH 29.8 07/27/2017 03:00 PM    MCHC 31.4 (L) 07/27/2017 03:00 PM    RDW 12.6 07/27/2017 03:00 PM     07/27/2017 03:00 PM    MPV 11.4 07/27/2017 03:00 PM      Lab Results   Component Value Date/Time     07/27/2017 03:00 PM    K 5.3 (H) 07/27/2017 03:00 PM    CO2 21 (L) 07/27/2017 03:00 PM    BUN 24 (H) 07/27/2017 03:00 PM    CREATININE 1.1 (H) 07/27/2017 03:00 PM    CALCIUM 9.9 07/27/2017 03:00 PM

## 2025-05-30 DIAGNOSIS — E05.90 HYPERTHYROIDISM: ICD-10-CM

## 2025-05-30 LAB
T4 FREE: 1.3 NG/DL (ref 0.9–1.7)
TSH SERPL DL<=0.05 MIU/L-ACNC: 1.95 UIU/ML (ref 0.27–4.2)

## 2025-06-09 ENCOUNTER — RESULTS FOLLOW-UP (OUTPATIENT)
Dept: ENDOCRINOLOGY | Age: 89
End: 2025-06-09

## 2025-07-15 ENCOUNTER — OFFICE VISIT (OUTPATIENT)
Dept: ORTHOPEDIC SURGERY | Age: 89
End: 2025-07-15
Payer: MEDICARE

## 2025-07-15 DIAGNOSIS — M19.012 GLENOHUMERAL ARTHRITIS, LEFT: Primary | ICD-10-CM

## 2025-07-15 PROCEDURE — G8427 DOCREV CUR MEDS BY ELIG CLIN: HCPCS | Performed by: ORTHOPAEDIC SURGERY

## 2025-07-15 PROCEDURE — G8417 CALC BMI ABV UP PARAM F/U: HCPCS | Performed by: ORTHOPAEDIC SURGERY

## 2025-07-15 PROCEDURE — 1036F TOBACCO NON-USER: CPT | Performed by: ORTHOPAEDIC SURGERY

## 2025-07-15 PROCEDURE — 1090F PRES/ABSN URINE INCON ASSESS: CPT | Performed by: ORTHOPAEDIC SURGERY

## 2025-07-15 PROCEDURE — 99213 OFFICE O/P EST LOW 20 MIN: CPT | Performed by: ORTHOPAEDIC SURGERY

## 2025-07-15 PROCEDURE — 1125F AMNT PAIN NOTED PAIN PRSNT: CPT | Performed by: ORTHOPAEDIC SURGERY

## 2025-07-15 PROCEDURE — 20610 DRAIN/INJ JOINT/BURSA W/O US: CPT | Performed by: ORTHOPAEDIC SURGERY

## 2025-07-15 PROCEDURE — 1159F MED LIST DOCD IN RCRD: CPT | Performed by: ORTHOPAEDIC SURGERY

## 2025-07-15 PROCEDURE — 1123F ACP DISCUSS/DSCN MKR DOCD: CPT | Performed by: ORTHOPAEDIC SURGERY

## 2025-07-15 RX ORDER — TRIAMCINOLONE ACETONIDE 40 MG/ML
40 INJECTION, SUSPENSION INTRA-ARTICULAR; INTRAMUSCULAR ONCE
Status: COMPLETED | OUTPATIENT
Start: 2025-07-15 | End: 2025-07-15

## 2025-07-15 RX ADMIN — TRIAMCINOLONE ACETONIDE 40 MG: 40 INJECTION, SUSPENSION INTRA-ARTICULAR; INTRAMUSCULAR at 08:13

## 2025-07-15 NOTE — PROGRESS NOTES
legs/feet with walking.  Respiratory: (-) SOB, (-) Coughing, (-) night sweats.  GI: (-) nausea, (-) vomiting, (-) diarrhea, (-) blood in stool, (-) gastric ulcer.  Psychiatric: (-) Depression, (-) Anxiety, (-) bipolar disease, (-) Alzheimer's Disease  Allergic/Immunologic: (-) allergies latex, (-) allergies metal, (-) skin sensitivity.  Hematlogic: (-) anemia, (-) blood transfusion, (-) DVT/PE, (-) Clotting disorders      Subjective:    Constitution:  There were no vitals taken for this visit.    Psycihatric:  The patient is alert and oriented x 3, appears to be stated age and in no distress.      Respiratory:  Respiratory effort is not labored.  Patient is not gasping.  Palpation of the chest reveals no tactile fremitus.    Skin:  Upon inspection: the skin appears warm, dry and intact.  There is not a previous scar over the affected area.There is not any cellulitis, lymphedema or cutaneous lesions noted in the lower extremities.   Upon palpation there is no induration noted.      Neurologic:  Motor exam of the upper extremities show: The reflexes in biceps/triceps/brachioradialis are equal and symmetric.  Sensory exam C5-T1 are normal bilaterally.     Cardiovascular:  The vascular exam is normal and is well perfused to distal extremities.There are 2+ radial pulses bilaterally, and motor and sensation is intact to median, ulnar, and radial, musclocutaneus, and axillary nerve distribution and grossly symmetric bilaterally.  There is cap refill noted less than two seconds in all digits. There is not edema of the bilateral upper extremities.  There is not varicosities noted in the distal extremities.      Lymph:  Upon palpation,  there is no lymphadenopathy noted in bilateral upper extremities.      Musculoskeletal:  Gait: normal; examination of the nails and digits reveal no cyanosis or clubbing.      Cervical Exam:  On physical exam, Natali Verduzco is well-developed, well-nourished, oriented to person, place and